# Patient Record
Sex: FEMALE | Race: WHITE | NOT HISPANIC OR LATINO | URBAN - METROPOLITAN AREA
[De-identification: names, ages, dates, MRNs, and addresses within clinical notes are randomized per-mention and may not be internally consistent; named-entity substitution may affect disease eponyms.]

---

## 2023-09-11 ENCOUNTER — APPOINTMENT (RX ONLY)
Dept: URBAN - METROPOLITAN AREA OTHER 7 | Facility: OTHER | Age: 53
Setting detail: DERMATOLOGY
End: 2023-09-11

## 2023-09-11 VITALS — WEIGHT: 134 LBS | HEIGHT: 60 IN

## 2023-09-11 DIAGNOSIS — Z42.1 ENCOUNTER FOR BREAST RECONSTRUCTION FOLLOWING MASTECTOMY: ICD-10-CM

## 2023-09-11 PROCEDURE — 99204 OFFICE O/P NEW MOD 45 MIN: CPT

## 2023-09-11 PROCEDURE — ? COUNSELING -  BREAST RECONSTRUCTION

## 2023-09-11 NOTE — HPI: BREAST RECONSTRUCTION CONSULTATION
Which Side(S)?: the right breast
What Is Your Pre-Diagnosis Bra Size (Numeric)?: 34
How Severe Are Your Concerns?: severe
Is This A New Presentation, Or A Follow-Up?: Breast Reconstruction Consultation
Who Is Your Primary Care Provider (If Known)?: None
Who Referred You?: Dr. Roach

## 2023-09-11 NOTE — PROCEDURE: COUNSELING -  BREAST RECONSTRUCTION
Other Plan: Plane conversion to pre-pectoral plain from submuscular plain
Detail Level: Simple
Count Minor/Major Decisions Toward Mdm (Not Cosmetic)?: No

## 2023-10-30 ENCOUNTER — APPOINTMENT (RX ONLY)
Dept: URBAN - METROPOLITAN AREA OTHER 7 | Facility: OTHER | Age: 53
Setting detail: DERMATOLOGY
End: 2023-10-30

## 2023-11-06 ENCOUNTER — APPOINTMENT (RX ONLY)
Dept: URBAN - METROPOLITAN AREA OTHER 7 | Facility: OTHER | Age: 53
Setting detail: DERMATOLOGY
End: 2023-11-06

## 2023-11-06 DIAGNOSIS — Z42.1 ENCOUNTER FOR BREAST RECONSTRUCTION FOLLOWING MASTECTOMY: ICD-10-CM

## 2023-11-06 PROCEDURE — ? COUNSELING -  BREAST RECONSTRUCTION

## 2023-11-06 NOTE — HPI: PREOPERATIVE APPOINTMENT
Has The Patient Completed Informed Consent?: has completed informed consent documentation
Date Of Procedure?: 10/07/2023
Facility: [facility]
Surgeon: Dr. Cai

## 2023-11-10 ENCOUNTER — APPOINTMENT (RX ONLY)
Dept: URBAN - METROPOLITAN AREA OTHER 7 | Facility: OTHER | Age: 53
Setting detail: DERMATOLOGY
End: 2023-11-10

## 2023-11-10 DIAGNOSIS — Z48.89 ENCOUNTER FOR OTHER SPECIFIED SURGICAL AFTERCARE: ICD-10-CM

## 2023-11-10 PROCEDURE — ? COUNSELING - POST-OP CHECK, BREAST RECONSTRUCTION, EXPANDER/IMPLANT

## 2023-11-10 PROCEDURE — 99024 POSTOP FOLLOW-UP VISIT: CPT

## 2023-11-10 NOTE — PROCEDURE: COUNSELING - POST-OP CHECK, BREAST RECONSTRUCTION, EXPANDER/IMPLANT
Count Minor/Major Decisions Toward Mdm (Not Cosmetic)?: No
Detail Level: Simple
Add Postop Global No-Charge Code (97155)?: yes

## 2023-11-13 ENCOUNTER — APPOINTMENT (RX ONLY)
Dept: URBAN - METROPOLITAN AREA OTHER 7 | Facility: OTHER | Age: 53
Setting detail: DERMATOLOGY
End: 2023-11-13

## 2023-11-13 DIAGNOSIS — Z48.89 ENCOUNTER FOR OTHER SPECIFIED SURGICAL AFTERCARE: ICD-10-CM

## 2023-11-13 PROCEDURE — 99024 POSTOP FOLLOW-UP VISIT: CPT

## 2023-11-13 PROCEDURE — ? COUNSELING - POST-OP CHECK, BREAST RECONSTRUCTION, EXPANDER/IMPLANT

## 2023-11-13 PROCEDURE — ? TISSUE EXPANDER FILL (BREASTS)

## 2023-11-13 ASSESSMENT — LOCATION SIMPLE DESCRIPTION DERM
LOCATION SIMPLE: RIGHT BREAST
LOCATION SIMPLE: LEFT BREAST

## 2023-11-13 ASSESSMENT — LOCATION ZONE DERM: LOCATION ZONE: TRUNK

## 2023-11-13 ASSESSMENT — LOCATION DETAILED DESCRIPTION DERM
LOCATION DETAILED: LEFT PERIAREOLAR BREAST 12-1:00 REGION
LOCATION DETAILED: RIGHT PERIAREOLAR BREAST 12-1:00 REGION

## 2023-11-13 NOTE — PROCEDURE: TISSUE EXPANDER FILL (BREASTS)
Left Breast Expander: Yes
Volume Change Toggle: Show All Visits
Initial Volume (Cc): 0
Detail Level: Simple
Volume Added/Subtracted Visit 1: Added 50cc
Postcare: The injection site was hemostatic. A small bandaid was applied to the injection site. There were no apparent complications.
Filled With: normal saline
Wound Care: Surgical site inspection was performed.
Tissue Expander Text: The tissue expander port site was identified. The skin overlying the port site was sterilely cleansed. The needle was introduced into the expander, entry was confirmed by drawback on the plunger.

## 2023-11-13 NOTE — PROCEDURE: COUNSELING - POST-OP CHECK, BREAST RECONSTRUCTION, EXPANDER/IMPLANT
Detail Level: Simple
Add Postop Global No-Charge Code (71672)?: yes
Count Minor/Major Decisions Toward Mdm (Not Cosmetic)?: No

## 2023-11-28 ENCOUNTER — APPOINTMENT (RX ONLY)
Dept: URBAN - METROPOLITAN AREA OTHER 7 | Facility: OTHER | Age: 53
Setting detail: DERMATOLOGY
End: 2023-11-28

## 2023-11-28 DIAGNOSIS — Z48.89 ENCOUNTER FOR OTHER SPECIFIED SURGICAL AFTERCARE: ICD-10-CM

## 2023-11-28 PROCEDURE — ? COUNSELING - POST-OP CHECK, BREAST RECONSTRUCTION, EXPANDER/IMPLANT

## 2023-11-28 PROCEDURE — ? TISSUE EXPANDER FILL (BREASTS)

## 2023-11-28 PROCEDURE — 99024 POSTOP FOLLOW-UP VISIT: CPT

## 2023-11-28 ASSESSMENT — LOCATION DETAILED DESCRIPTION DERM
LOCATION DETAILED: RIGHT MEDIAL BREAST 12-1:00 REGION
LOCATION DETAILED: LEFT LATERAL BREAST 12-1:00 REGION

## 2023-11-28 ASSESSMENT — LOCATION SIMPLE DESCRIPTION DERM
LOCATION SIMPLE: RIGHT BREAST
LOCATION SIMPLE: LEFT BREAST

## 2023-11-28 ASSESSMENT — LOCATION ZONE DERM: LOCATION ZONE: TRUNK

## 2023-11-28 NOTE — PROCEDURE: COUNSELING - POST-OP CHECK, BREAST RECONSTRUCTION, EXPANDER/IMPLANT
Add Postop Global No-Charge Code (72268)?: yes
Detail Level: Simple
Count Minor/Major Decisions Toward Mdm (Not Cosmetic)?: No

## 2023-11-28 NOTE — PROCEDURE: TISSUE EXPANDER FILL (BREASTS)
Wound Care: Surgical site inspection was performed.
Volume Added/Subtracted Visit 2: Added 150cc
Postcare: The injection site was hemostatic. A small bandaid was applied to the injection site. There were no apparent complications.
Initial Volume (Cc): 0
Detail Level: Simple
Filled With: normal saline
Volume Added/Subtracted Visit 1: Added 50cc
Volume Change Toggle: Show All Visits
Right Breast Expander?: Yes
Tissue Expander Text: The tissue expander port site was identified. The skin overlying the port site was sterilely cleansed. The needle was introduced into the expander, entry was confirmed by drawback on the plunger.

## 2023-12-05 ENCOUNTER — APPOINTMENT (RX ONLY)
Dept: URBAN - METROPOLITAN AREA OTHER 7 | Facility: OTHER | Age: 53
Setting detail: DERMATOLOGY
End: 2023-12-05

## 2023-12-05 DIAGNOSIS — Z48.89 ENCOUNTER FOR OTHER SPECIFIED SURGICAL AFTERCARE: ICD-10-CM

## 2023-12-05 PROCEDURE — ? DRAIN REMOVAL

## 2023-12-05 PROCEDURE — ? CODE 99024 - NO CHARGE POST-OP VISIT

## 2023-12-05 PROCEDURE — ? TISSUE EXPANDER FILL (BREASTS)

## 2023-12-05 PROCEDURE — 99024 POSTOP FOLLOW-UP VISIT: CPT

## 2023-12-05 ASSESSMENT — LOCATION DETAILED DESCRIPTION DERM
LOCATION DETAILED: RIGHT LATERAL BREAST 8-9:00 REGION
LOCATION DETAILED: LEFT LATERAL BREAST 2-3:00 REGION

## 2023-12-05 ASSESSMENT — LOCATION SIMPLE DESCRIPTION DERM
LOCATION SIMPLE: LEFT BREAST
LOCATION SIMPLE: RIGHT BREAST

## 2023-12-05 ASSESSMENT — LOCATION ZONE DERM: LOCATION ZONE: TRUNK

## 2023-12-05 NOTE — PROCEDURE: TISSUE EXPANDER FILL (BREASTS)
Volume Added/Subtracted Visit 1: Added 50cc
Volume Change Toggle: Show All Visits
Filled With: normal saline
Volume Added/Subtracted Visit 3: Added 100cc
Right Breast Expander?: Yes
Volume Added/Subtracted Visit 2: Added 150cc
Wound Care: Surgical site inspection was performed.
Tissue Expander Text: The tissue expander port site was identified. The skin overlying the port site was sterilely cleansed. The needle was introduced into the expander, entry was confirmed by drawback on the plunger.
Postcare: The injection site was hemostatic. A small bandaid was applied to the injection site. There were no apparent complications.
Initial Volume (Cc): 0
Detail Level: Simple

## 2023-12-13 ENCOUNTER — APPOINTMENT (RX ONLY)
Dept: URBAN - METROPOLITAN AREA OTHER 7 | Facility: OTHER | Age: 53
Setting detail: DERMATOLOGY
End: 2023-12-13

## 2023-12-13 DIAGNOSIS — Z48.89 ENCOUNTER FOR OTHER SPECIFIED SURGICAL AFTERCARE: ICD-10-CM

## 2023-12-13 PROCEDURE — ? COUNSELING - POST-OP CHECK, BREAST RECONSTRUCTION, EXPANDER/IMPLANT

## 2023-12-13 PROCEDURE — 99024 POSTOP FOLLOW-UP VISIT: CPT

## 2023-12-13 ASSESSMENT — LOCATION SIMPLE DESCRIPTION DERM
LOCATION SIMPLE: RIGHT BREAST
LOCATION SIMPLE: LEFT BREAST

## 2023-12-13 ASSESSMENT — LOCATION DETAILED DESCRIPTION DERM
LOCATION DETAILED: LEFT LATERAL BREAST 12-1:00 REGION
LOCATION DETAILED: RIGHT MEDIAL BREAST 12-1:00 REGION

## 2023-12-13 ASSESSMENT — LOCATION ZONE DERM: LOCATION ZONE: TRUNK

## 2023-12-13 NOTE — PROCEDURE: COUNSELING - POST-OP CHECK, BREAST RECONSTRUCTION, EXPANDER/IMPLANT
Count Minor/Major Decisions Toward Mdm (Not Cosmetic)?: No
Detail Level: Simple
Add Postop Global No-Charge Code (28596)?: yes

## 2023-12-20 ENCOUNTER — APPOINTMENT (RX ONLY)
Dept: URBAN - METROPOLITAN AREA OTHER 7 | Facility: OTHER | Age: 53
Setting detail: DERMATOLOGY
End: 2023-12-20

## 2023-12-20 DIAGNOSIS — L74.51 PRIMARY FOCAL HYPERHIDROSIS: ICD-10-CM

## 2023-12-20 DIAGNOSIS — Z48.89 ENCOUNTER FOR OTHER SPECIFIED SURGICAL AFTERCARE: ICD-10-CM

## 2023-12-20 PROBLEM — L74.519 PRIMARY FOCAL HYPERHIDROSIS, UNSPECIFIED: Status: ACTIVE | Noted: 2023-12-20

## 2023-12-20 PROCEDURE — ? COUNSELING - POST-OP CHECK, BREAST RECONSTRUCTION, EXPANDER/IMPLANT

## 2023-12-20 PROCEDURE — ? SUTURE REMOVAL

## 2023-12-20 PROCEDURE — 99024 POSTOP FOLLOW-UP VISIT: CPT

## 2023-12-20 PROCEDURE — ? PRESCRIPTION

## 2023-12-20 RX ORDER — SILVER SULFADIAZINE 10 MG/G
CREAM TOPICAL
Qty: 50 | Refills: 2 | Status: ERX | COMMUNITY
Start: 2023-12-20

## 2023-12-20 RX ORDER — MUPIROCIN 20 MG/G
OINTMENT TOPICAL
Qty: 22 | Refills: 3 | Status: ERX | COMMUNITY
Start: 2023-12-20

## 2023-12-20 RX ADMIN — SILVER SULFADIAZINE: 10 CREAM TOPICAL at 00:00

## 2023-12-20 RX ADMIN — MUPIROCIN: 20 OINTMENT TOPICAL at 00:00

## 2023-12-20 ASSESSMENT — LOCATION SIMPLE DESCRIPTION DERM
LOCATION SIMPLE: RIGHT BREAST
LOCATION SIMPLE: LEFT BREAST

## 2023-12-20 ASSESSMENT — LOCATION ZONE DERM
LOCATION ZONE: TRUNK
LOCATION ZONE: TRUNK

## 2023-12-20 ASSESSMENT — LOCATION DETAILED DESCRIPTION DERM
LOCATION DETAILED: RIGHT AXILLARY TAIL OF BREAST
LOCATION DETAILED: LEFT PERIAREOLAR BREAST 7-8:00 REGION

## 2023-12-20 NOTE — PROCEDURE: COUNSELING - POST-OP CHECK, BREAST RECONSTRUCTION, EXPANDER/IMPLANT
Add Postop Global No-Charge Code (10503)?: yes
Detail Level: Simple
Count Minor/Major Decisions Toward Mdm (Not Cosmetic)?: No

## 2024-01-03 ENCOUNTER — APPOINTMENT (RX ONLY)
Dept: URBAN - METROPOLITAN AREA OTHER 7 | Facility: OTHER | Age: 54
Setting detail: DERMATOLOGY
End: 2024-01-03

## 2024-01-03 DIAGNOSIS — T81.89 OTHER COMPLICATIONS OF PROCEDURES, NOT ELSEWHERE CLASSIFIED: ICD-10-CM

## 2024-01-03 DIAGNOSIS — Z48.89 ENCOUNTER FOR OTHER SPECIFIED SURGICAL AFTERCARE: ICD-10-CM | Status: STABLE

## 2024-01-03 PROBLEM — T81.89XA OTHER COMPLICATIONS OF PROCEDURES, NOT ELSEWHERE CLASSIFIED, INITIAL ENCOUNTER: Status: ACTIVE | Noted: 2024-01-03

## 2024-01-03 PROCEDURE — ? COUNSELING - NON-HEALING WOUND

## 2024-01-03 PROCEDURE — 99024 POSTOP FOLLOW-UP VISIT: CPT

## 2024-01-03 PROCEDURE — ? PRESCRIPTION

## 2024-01-03 RX ORDER — DOXYCYCLINE HYCLATE 100 MG/1
1 CAPSULE, GELATIN COATED ORAL BID
Qty: 28 | Refills: 0 | Status: ERX | COMMUNITY
Start: 2024-01-03

## 2024-01-03 RX ADMIN — DOXYCYCLINE HYCLATE 1: 100 CAPSULE, GELATIN COATED ORAL at 00:00

## 2024-01-10 ENCOUNTER — APPOINTMENT (RX ONLY)
Dept: URBAN - METROPOLITAN AREA OTHER 7 | Facility: OTHER | Age: 54
Setting detail: DERMATOLOGY
End: 2024-01-10

## 2024-01-10 ENCOUNTER — RX ONLY (OUTPATIENT)
Age: 54
Setting detail: RX ONLY
End: 2024-01-10

## 2024-01-10 DIAGNOSIS — Z48.89 ENCOUNTER FOR OTHER SPECIFIED SURGICAL AFTERCARE: ICD-10-CM

## 2024-01-10 DIAGNOSIS — M79.2 NEURALGIA AND NEURITIS, UNSPECIFIED: ICD-10-CM

## 2024-01-10 PROCEDURE — ? DRESSING CHANGE

## 2024-01-10 PROCEDURE — ? COUNSELING - NEUROLOGIC INJURY

## 2024-01-10 PROCEDURE — ? CODE 99024 - NO CHARGE POST-OP VISIT

## 2024-01-10 PROCEDURE — ? COUNSELING - POST-OP CHECK

## 2024-01-10 PROCEDURE — 99024 POSTOP FOLLOW-UP VISIT: CPT

## 2024-01-10 RX ORDER — CELECOXIB 200 MG/1
1 CAPSULE CAPSULE ORAL TWICE DAILY
Qty: 60 | Refills: 2 | Status: ERX | COMMUNITY
Start: 2024-01-10

## 2024-01-10 RX ORDER — GABAPENTIN 300 MG/1
1 CAPSULE CAPSULE ORAL TWICE DAILY
Qty: 60 | Refills: 2 | Status: ERX | COMMUNITY
Start: 2024-01-10

## 2024-01-10 ASSESSMENT — LOCATION SIMPLE DESCRIPTION DERM
LOCATION SIMPLE: RIGHT BREAST
LOCATION SIMPLE: RIGHT BREAST

## 2024-01-10 ASSESSMENT — LOCATION ZONE DERM
LOCATION ZONE: TRUNK
LOCATION ZONE: TRUNK

## 2024-01-10 ASSESSMENT — LOCATION DETAILED DESCRIPTION DERM
LOCATION DETAILED: RIGHT LATERAL BREAST 11-12:00 REGION
LOCATION DETAILED: RIGHT MEDIAL BREAST 5-6:00 REGION

## 2024-01-17 ENCOUNTER — APPOINTMENT (RX ONLY)
Dept: URBAN - METROPOLITAN AREA OTHER 7 | Facility: OTHER | Age: 54
Setting detail: DERMATOLOGY
End: 2024-01-17

## 2024-01-17 DIAGNOSIS — G62.9 POLYNEUROPATHY, UNSPECIFIED: ICD-10-CM

## 2024-01-17 DIAGNOSIS — Z48.89 ENCOUNTER FOR OTHER SPECIFIED SURGICAL AFTERCARE: ICD-10-CM

## 2024-01-17 PROCEDURE — 99024 POSTOP FOLLOW-UP VISIT: CPT

## 2024-01-17 PROCEDURE — ? CODE 99024 - NO CHARGE POST-OP VISIT

## 2024-01-17 PROCEDURE — ? REFERRAL

## 2024-01-17 PROCEDURE — ? COUNSELING - POST-OP CHECK, BREAST RECONSTRUCTION, EXPANDER/IMPLANT

## 2024-01-17 ASSESSMENT — LOCATION ZONE DERM
LOCATION ZONE: TRUNK
LOCATION ZONE: ARM

## 2024-01-17 ASSESSMENT — LOCATION SIMPLE DESCRIPTION DERM
LOCATION SIMPLE: RIGHT BREAST
LOCATION SIMPLE: RIGHT SHOULDER

## 2024-01-17 ASSESSMENT — LOCATION DETAILED DESCRIPTION DERM
LOCATION DETAILED: RIGHT ANTERIOR SHOULDER
LOCATION DETAILED: RIGHT LATERAL BREAST 6-7:00 REGION

## 2024-01-17 NOTE — PROCEDURE: COUNSELING - POST-OP CHECK, BREAST RECONSTRUCTION, EXPANDER/IMPLANT
Count Minor/Major Decisions Toward Mdm (Not Cosmetic)?: No
Add Postop Global No-Charge Code (20826)?: yes
Detail Level: Simple

## 2024-01-24 ENCOUNTER — APPOINTMENT (RX ONLY)
Dept: URBAN - METROPOLITAN AREA OTHER 7 | Facility: OTHER | Age: 54
Setting detail: DERMATOLOGY
End: 2024-01-24

## 2024-01-24 DIAGNOSIS — Z48.89 ENCOUNTER FOR OTHER SPECIFIED SURGICAL AFTERCARE: ICD-10-CM

## 2024-01-24 DIAGNOSIS — Z48.02 ENCOUNTER FOR REMOVAL OF SUTURES: ICD-10-CM

## 2024-01-24 PROCEDURE — ? COUNSELING - SUTURE REMOVAL

## 2024-01-24 PROCEDURE — ? TISSUE EXPANDER FILL (BREASTS)

## 2024-01-24 PROCEDURE — ? SUTURE REMOVAL

## 2024-01-24 PROCEDURE — 99024 POSTOP FOLLOW-UP VISIT: CPT

## 2024-01-24 ASSESSMENT — LOCATION SIMPLE DESCRIPTION DERM
LOCATION SIMPLE: RIGHT BREAST
LOCATION SIMPLE: LEFT BREAST

## 2024-01-24 ASSESSMENT — LOCATION DETAILED DESCRIPTION DERM
LOCATION DETAILED: LEFT MEDIAL BREAST 7-8:00 REGION
LOCATION DETAILED: RIGHT MEDIAL BREAST 5-6:00 REGION

## 2024-01-24 ASSESSMENT — LOCATION ZONE DERM: LOCATION ZONE: TRUNK

## 2024-01-24 NOTE — PROCEDURE: TISSUE EXPANDER FILL (BREASTS)
Tissue Expander Text: The tissue expander port site was identified. The skin overlying the port site was sterilely cleansed. The needle was introduced into the expander, entry was confirmed by drawback on the plunger.
Volume Added/Subtracted Visit 1: Added 50cc
Filled With: normal saline
Wound Care: Surgical site inspection was performed.
Postcare: The injection site was hemostatic. A small bandaid was applied to the injection site. There were no apparent complications.
Right Breast Expander?: Yes
Volume Change Toggle: Show All Visits
Initial Volume (Cc): 150
Detail Level: Simple

## 2024-01-31 ENCOUNTER — APPOINTMENT (RX ONLY)
Dept: URBAN - METROPOLITAN AREA OTHER 7 | Facility: OTHER | Age: 54
Setting detail: DERMATOLOGY
End: 2024-01-31

## 2024-01-31 DIAGNOSIS — Z48.89 ENCOUNTER FOR OTHER SPECIFIED SURGICAL AFTERCARE: ICD-10-CM

## 2024-01-31 PROCEDURE — ? TISSUE EXPANDER FILL (BREASTS)

## 2024-01-31 PROCEDURE — ? COUNSELING - POST-OP CHECK, BREAST RECONSTRUCTION, EXPANDER/IMPLANT

## 2024-01-31 PROCEDURE — 99024 POSTOP FOLLOW-UP VISIT: CPT

## 2024-01-31 PROCEDURE — ? OTHER

## 2024-01-31 ASSESSMENT — LOCATION ZONE DERM: LOCATION ZONE: TRUNK

## 2024-01-31 ASSESSMENT — LOCATION DETAILED DESCRIPTION DERM
LOCATION DETAILED: LEFT LATERAL BREAST 12-1:00 REGION
LOCATION DETAILED: RIGHT MEDIAL BREAST 1-2:00 REGION

## 2024-01-31 ASSESSMENT — LOCATION SIMPLE DESCRIPTION DERM
LOCATION SIMPLE: LEFT BREAST
LOCATION SIMPLE: RIGHT BREAST

## 2024-01-31 NOTE — PROCEDURE: OTHER
Sticky Other (Free Text): 30 cc right breast zero a fluid drained
Render Risk Assessment In Note?: no
Detail Level: Zone

## 2024-01-31 NOTE — PROCEDURE: COUNSELING - POST-OP CHECK, BREAST RECONSTRUCTION, EXPANDER/IMPLANT
Detail Level: Simple
Add Postop Global No-Charge Code (75200)?: yes
Count Minor/Major Decisions Toward Mdm (Not Cosmetic)?: No

## 2024-01-31 NOTE — PROCEDURE: TISSUE EXPANDER FILL (BREASTS)
Volume Added/Subtracted Visit 1: Added 50cc
Right Breast Expander?: Yes
Volume Change Toggle: Show All Visits
Tissue Expander Text: The tissue expander port site was identified. The skin overlying the port site was sterilely cleansed. The needle was introduced into the expander, entry was confirmed by drawback on the plunger.
Detail Level: Simple
Initial Volume (Cc): 200
Wound Care: Surgical site inspection was performed.
Filled With: normal saline
Postcare: The injection site was hemostatic. A small bandaid was applied to the injection site. There were no apparent complications.

## 2024-02-12 ENCOUNTER — APPOINTMENT (RX ONLY)
Dept: URBAN - METROPOLITAN AREA OTHER 7 | Facility: OTHER | Age: 54
Setting detail: DERMATOLOGY
End: 2024-02-12

## 2024-02-12 DIAGNOSIS — L30.9 DERMATITIS, UNSPECIFIED: ICD-10-CM

## 2024-02-12 DIAGNOSIS — L53.8 OTHER SPECIFIED ERYTHEMATOUS CONDITIONS: ICD-10-CM

## 2024-02-12 PROCEDURE — ? PRESCRIPTION

## 2024-02-12 PROCEDURE — ?

## 2024-02-12 PROCEDURE — 99212 OFFICE O/P EST SF 10 MIN: CPT

## 2024-02-12 RX ORDER — DOXYCYCLINE HYCLATE 100 MG/1
1 CAPSULE, GELATIN COATED ORAL BID
Qty: 20 | Refills: 0 | Status: ERX

## 2024-02-12 ASSESSMENT — LOCATION DETAILED DESCRIPTION DERM
LOCATION DETAILED: SUB-XIPHOID
LOCATION DETAILED: RIGHT INFRAMAMMARY CREASE (INNER QUADRANT)

## 2024-02-12 ASSESSMENT — LOCATION ZONE DERM
LOCATION ZONE: TRUNK
LOCATION ZONE: TRUNK

## 2024-02-12 ASSESSMENT — LOCATION SIMPLE DESCRIPTION DERM
LOCATION SIMPLE: ABDOMEN
LOCATION SIMPLE: RIGHT BREAST

## 2024-02-12 NOTE — HPI: POST-OP CHECK, BREAST RECONSTRUCTION
History: Breast Reconstruction performed on 11/22/2023. Patient complains of rash under both breast x6 days. No pain, no fever, no new lotions or soaps.

## 2024-02-14 ENCOUNTER — APPOINTMENT (RX ONLY)
Dept: URBAN - METROPOLITAN AREA OTHER 7 | Facility: OTHER | Age: 54
Setting detail: DERMATOLOGY
End: 2024-02-14

## 2024-02-14 DIAGNOSIS — Z48.89 ENCOUNTER FOR OTHER SPECIFIED SURGICAL AFTERCARE: ICD-10-CM

## 2024-02-14 PROCEDURE — 99213 OFFICE O/P EST LOW 20 MIN: CPT

## 2024-02-14 PROCEDURE — ? TISSUE EXPANDER FILL (BREASTS)

## 2024-02-14 ASSESSMENT — LOCATION SIMPLE DESCRIPTION DERM
LOCATION SIMPLE: RIGHT BREAST
LOCATION SIMPLE: LEFT BREAST

## 2024-02-14 ASSESSMENT — LOCATION ZONE DERM: LOCATION ZONE: TRUNK

## 2024-02-14 NOTE — PROCEDURE: TISSUE EXPANDER FILL (BREASTS)
Wound Care: Surgical site inspection was performed.
Postcare: The injection site was hemostatic. A small bandaid was applied to the injection site. There were no apparent complications.
Left Breast Expander: Yes
Initial Volume (Cc): 250
Volume Change Toggle: Show All Visits
Detail Level: Simple
Volume Added/Subtracted Visit 1: Added 50cc
Filled With: normal saline
Tissue Expander Text: The tissue expander port site was identified. The skin overlying the port site was sterilely cleansed. The needle was introduced into the expander, entry was confirmed by drawback on the plunger.

## 2024-02-21 ENCOUNTER — APPOINTMENT (RX ONLY)
Dept: URBAN - METROPOLITAN AREA OTHER 7 | Facility: OTHER | Age: 54
Setting detail: DERMATOLOGY
End: 2024-02-21

## 2024-02-21 DIAGNOSIS — Z48.89 ENCOUNTER FOR OTHER SPECIFIED SURGICAL AFTERCARE: ICD-10-CM

## 2024-02-21 PROCEDURE — 99212 OFFICE O/P EST SF 10 MIN: CPT
